# Patient Record
Sex: FEMALE | Race: WHITE | URBAN - METROPOLITAN AREA
[De-identification: names, ages, dates, MRNs, and addresses within clinical notes are randomized per-mention and may not be internally consistent; named-entity substitution may affect disease eponyms.]

---

## 2022-01-26 ENCOUNTER — DOCUMENTATION ONLY (OUTPATIENT)
Dept: PEDIATRIC CARDIOLOGY | Facility: CLINIC | Age: 22
End: 2022-01-26

## 2023-03-23 ENCOUNTER — TELEPHONE (OUTPATIENT)
Dept: PEDIATRIC CARDIOLOGY | Facility: CLINIC | Age: 23
End: 2023-03-23
Payer: MEDICAID

## 2023-03-23 DIAGNOSIS — I10 ESSENTIAL (PRIMARY) HYPERTENSION: Primary | ICD-10-CM

## 2023-03-23 NOTE — TELEPHONE ENCOUNTER
Refill request from 3/9/2023    Lisinopril 20 mg tablets, take 1 tablet by mouth every day.   Last refill: 12/12/2022

## 2023-03-23 NOTE — TELEPHONE ENCOUNTER
Patient is due for follow up. Left message to schedule an appointment twice on 3/9/2023 and 3/23/2023 to [phone].

## 2023-04-04 NOTE — PROGRESS NOTES
BRANDO DISLA : 2000 (23 yo F) Acc No. [id number] DOS: 2022    BRANDO DISLA    22 Y old Female, : 2000    Account Number: [id number]    [address] , [address] [id number]    Home: [phone]     Guarantor: EMMANUELLE CAMARENA Insurance: [facility]   PCP: N No PCP Referring: N No PCP   Appointment Facility: [facility]     2022 Progress Notes: ANNELISE Ball MD          Reason for Appointment   1. Hypertension established patient   History of Present Illness   Hypertension:   I had the pleasure of seeing this patient in the pediatric cardiology office today. As you may recall, the patient is a 21 year old whom we follow with sickle cell anemia and hypertension. The patient was last seen 9 months ago and returns today for follow up. The patient patient reports medication compliance, with her most recent dose taken at 07:00 this morning. The patient does not monitor blood pressure at home. Of note, her most recent visit with hematology was on 2021 at which time, her labs remained stable. There have been no cardiovascular complaints of chest pain, shortness of breath, tachycardia, palpitations, dizziness, syncope, headaches, or decreased activity.    Current Medications   Taking    Multivitamins    Folic Acid    Lisinopril 20 MG Tablet 1 tablet Orally every day (qd)   Medication List reviewed and reconciled with the patient      Past Medical History   Sickle cell anemia - followed by Dr. Hill.     Hypertension.     Surgical History   Cholecystectomy 2010   Family History   3 brother(s) , 1 sister(s) - healthy.    Patient was adopted. Foster mom not aware of family history.   Social History   Observations: no.   Language: no language barriers.   Tobacco Use Are you a: never smoker.   Smokers in the household: No.   Education: College.   Exercise/activities: None.   Caffeine: occasionally.   Alcohol: occasional.   Drugs: no.     Allergies   N.K.D.A.   Hospitalization/Major Diagnostic Procedure   Multiple hospitalizations in past for complications of sickle cell disease 2010   Hypertension - [facility] 2014   Review of Systems   Constitutional:   Fatigue none. Fever none. Loss of appetite none. Weakness none. Weight none. Weight loss none.   Neurologic:   Syncope none. Dizziness none. Headaches none. Seizures none.   Ear, nose, throat:   Eyes none. Contacts or glasses none. Hearing loss none. Nasal congestion none. Sore throat none.   Respiratory:   Asthma none. Tachypnea none. Cough none. Shortness of breath none. Wheezing none.   Cardiovascular:   See HPI for details.   Gastrointestinal:   Abdomen none. Constipation none. Diarrhea none. Nausea none. Vomiting none.   Endocrine:   Thyroid disease none. Diabetes none.   Genitourinary:   Renal disease none. Urinary tract infection none.   Musculoskeletal:   Joint pain none. Joint swelling none. Muscle none.   Dermatologic:   Itching none.   Hematology, oncology:   Anemia sickle cell anemia. Abnormal bleeding none. Clotting disorder none.   Allergy:   Seasonal/Environmental none. Food none. Latex none.   Psychology:   ADD or ADHD none . Nervousness none . Mental Illness none . Anxiety none. Depression none.      Vital Signs   Ht 158 cm, Wt 52.5 kg, BMI 21.03, heart rate (HR) 60 bpm, blood pressure (BP) Right Arm:127/77 mmHg, repeat blood pressure (BP) Manual: 116/70 mmHg, respiratory rate (RR) 22.   Physical Examination   General:   General Appearance: pleasant. Nutrition well nourished. Distress none. Cyanosis none.   HEENT:   Head: atraumatic, normocephalic.   Neck:   Neck: supple. Range of Motion: normal.   Chest:   Shape and Expansion: equal expansion bilaterally, no retractions, no grunting. Chest wall: no gross deformities, no tenderness. Breath Sounds: clear to auscultation, no wheezing, rhonchi, crackles, or stridor. Crackles: none. Wheezes: none.   Heart:   Inspection:  normal and acyanotic. Palpation: normal point of maximal impulse. Rate: regular. Rhythm: regular. S1: normal. S2: physiologically split. Clicks: none. Systolic murmurs: none present. Diastolic murmurs: none present. Rubs, Gallops: none. Pulses: brachial artery equals femoral artery without delay.   Abdomen:   Shape: normal. Palpation soft. Tenderness: none. Liver, Spleen: no hepatosplenomegaly.   Extremities:   Clubbing: no. Cyanosis: no. Edema: no. Pulses: 2+ bilaterally.      Assessments      1. Essential (primary) hypertension - I10 (Primary)   2. Sickle-cell disease without crisis - D57.1   3. Nonrheumatic tricuspid (valve) insufficiency - I36.1   In summary, Meagan has sickle cell anemia and a history of mild hypertension. Her blood pressure is well controlled on the current regimen. I am therefore not going to make any adjustments today and let her continue at the current dose of Lisinopril 20 mg once daily. Additionally, she has no evidence of pulmonary hypertension on previous echocardigorams. I will be in contact with her hematologist to discuss with him further if there was a suggestion of pulmonary hypertension noted in his office. I asked her to return for follow up in 1 year for a repeat evaluation. Please contact me with questions regarding this patient.   Treatment   1. Essential (primary) hypertension   Continue Lisinopril Tablet, 20 MG, 1 tablet, Orally, Once a day, 30 day(s), 90, Refills 4   Procedures   Echocardiogram:   Normal: Grossly structurally normal intracardiac anatomy. No significant atrioventricular valve insufficiency was present. The cardiac contractility was good. The aortic arch appeared normal. No pericardial effusion was present.               Preventive Medicine   Counseling: SBE prophylaxis - none indicated. Exercise - No activity restrictions.    Procedure Codes   05732 Echocardiogram - bundled   Follow Up   1 Year (Reason: Manual Blood Pressure,Electrocardiogram,Medication Check)                Electronically signed by Sudhir Ball MD on 01/26/2022 at 09:15 AM CST   Sign off status: Completed

## 2023-04-10 RX ORDER — LISINOPRIL 20 MG/1
20 TABLET ORAL DAILY
Qty: 30 TABLET | Refills: 0 | Status: SHIPPED | OUTPATIENT
Start: 2023-04-10 | End: 2023-05-10

## 2023-04-10 RX ORDER — LISINOPRIL 20 MG/1
20 TABLET ORAL
COMMUNITY
Start: 2022-12-12 | End: 2023-04-10 | Stop reason: SDUPTHER